# Patient Record
Sex: FEMALE | Race: WHITE
[De-identification: names, ages, dates, MRNs, and addresses within clinical notes are randomized per-mention and may not be internally consistent; named-entity substitution may affect disease eponyms.]

---

## 2019-10-04 ENCOUNTER — HOSPITAL ENCOUNTER (EMERGENCY)
Dept: HOSPITAL 97 - ER | Age: 49
LOS: 1 days | Discharge: HOME | End: 2019-10-05
Payer: COMMERCIAL

## 2019-10-04 DIAGNOSIS — R07.9: Primary | ICD-10-CM

## 2019-10-04 DIAGNOSIS — E03.9: ICD-10-CM

## 2019-10-04 DIAGNOSIS — I10: ICD-10-CM

## 2019-10-04 LAB
ALBUMIN SERPL BCP-MCNC: 4.4 G/DL (ref 3.4–5)
ALP SERPL-CCNC: 76 U/L (ref 45–117)
ALT SERPL W P-5'-P-CCNC: 46 U/L (ref 12–78)
AST SERPL W P-5'-P-CCNC: 45 U/L (ref 15–37)
BUN BLD-MCNC: 15 MG/DL (ref 7–18)
COHGB MFR BLDA: 0.7 % (ref 0–1.5)
GLUCOSE SERPLBLD-MCNC: 100 MG/DL (ref 74–106)
HCT VFR BLD CALC: 44.6 % (ref 36–45)
INR BLD: 1.01
LYMPHOCYTES # SPEC AUTO: 4 K/UL (ref 0.7–4.9)
MAGNESIUM SERPL-MCNC: 2.3 MG/DL (ref 1.8–2.4)
NT-PROBNP SERPL-MCNC: 24 PG/ML (ref ?–125)
OXYHGB MFR BLDA: 94.4 % (ref 94–97)
PMV BLD: 9.8 FL (ref 7.6–11.3)
POTASSIUM SERPL-SCNC: 3.3 MMOL/L (ref 3.5–5.1)
RBC # BLD: 4.94 M/UL (ref 3.86–4.86)
SAO2 % BLDA: 95.9 % (ref 92–98.5)
TROPONIN (EMERG DEPT USE ONLY): < 0.02 NG/ML (ref 0–0.04)
TSH SERPL DL<=0.05 MIU/L-ACNC: 74.7 UIU/ML (ref 0.36–3.74)

## 2019-10-04 PROCEDURE — 81025 URINE PREGNANCY TEST: CPT

## 2019-10-04 PROCEDURE — 82805 BLOOD GASES W/O2 SATURATION: CPT

## 2019-10-04 PROCEDURE — 84443 ASSAY THYROID STIM HORMONE: CPT

## 2019-10-04 PROCEDURE — 80076 HEPATIC FUNCTION PANEL: CPT

## 2019-10-04 PROCEDURE — 99285 EMERGENCY DEPT VISIT HI MDM: CPT

## 2019-10-04 PROCEDURE — 93005 ELECTROCARDIOGRAM TRACING: CPT

## 2019-10-04 PROCEDURE — 36415 COLL VENOUS BLD VENIPUNCTURE: CPT

## 2019-10-04 PROCEDURE — 85610 PROTHROMBIN TIME: CPT

## 2019-10-04 PROCEDURE — 80048 BASIC METABOLIC PNL TOTAL CA: CPT

## 2019-10-04 PROCEDURE — 84484 ASSAY OF TROPONIN QUANT: CPT

## 2019-10-04 PROCEDURE — 84439 ASSAY OF FREE THYROXINE: CPT

## 2019-10-04 PROCEDURE — 83880 ASSAY OF NATRIURETIC PEPTIDE: CPT

## 2019-10-04 PROCEDURE — 85025 COMPLETE CBC W/AUTO DIFF WBC: CPT

## 2019-10-04 PROCEDURE — 83735 ASSAY OF MAGNESIUM: CPT

## 2019-10-04 PROCEDURE — 81003 URINALYSIS AUTO W/O SCOPE: CPT

## 2019-10-04 PROCEDURE — 85379 FIBRIN DEGRADATION QUANT: CPT

## 2019-10-04 PROCEDURE — 71045 X-RAY EXAM CHEST 1 VIEW: CPT

## 2019-10-05 VITALS — SYSTOLIC BLOOD PRESSURE: 169 MMHG | DIASTOLIC BLOOD PRESSURE: 103 MMHG | OXYGEN SATURATION: 97 %

## 2019-10-05 VITALS — TEMPERATURE: 98.7 F

## 2019-10-05 LAB
HCT VFR BLD CALC: 41.5 % (ref 36–45)
LYMPHOCYTES # SPEC AUTO: 3.7 K/UL (ref 0.7–4.9)
PMV BLD: 9.5 FL (ref 7.6–11.3)
RBC # BLD: 4.6 M/UL (ref 3.86–4.86)

## 2019-10-05 NOTE — EKG
Test Date:    2019-10-05               Test Time:    00:04:32

Technician:   MICHAEL                                    

                                                     

MEASUREMENT RESULTS:                                       

Intervals:                                           

Rate:         73                                     

IN:           138                                    

QRSD:         86                                     

QT:           406                                    

QTc:          447                                    

Axis:                                                

P:            40                                     

IN:           138                                    

QRS:          0                                      

T:            56                                     

                                                     

INTERPRETIVE STATEMENTS:                                       

                                                     

Normal sinus rhythm

Anterior infarct, age undetermined

Abnormal ECG

Compared to ECG 10/04/2019 21:00:44

No significant changes



Electronically Signed On 10-05-19 10:45:20 CDT by Neo David

## 2019-10-05 NOTE — EKG
Test Date:    2019-10-04               Test Time:    21:00:44

Technician:   ANALIA                                     

                                                     

MEASUREMENT RESULTS:                                       

Intervals:                                           

Rate:         86                                     

MN:           134                                    

QRSD:         90                                     

QT:           372                                    

QTc:          445                                    

Axis:                                                

P:            39                                     

MN:           134                                    

QRS:          3                                      

T:            -4                                     

                                                     

INTERPRETIVE STATEMENTS:                                       

                                                     

Normal sinus rhythm

Anterior infarct, age undetermined

Abnormal ECG

No previous ECG available for comparison



Electronically Signed On 10-05-19 10:46:00 CDT by Neo David

## 2019-10-05 NOTE — EDPHYS
Physician Documentation                                                                           

 [facility]                                                                 

Name: Katih Laurent                                                                                

Age: 49 yrs                                                                                       

Sex: Female                                                                                       

: 1970                                                                                   

MRN: [id number]                                                                                   

Arrival Date: 10/04/2019                                                                          

Time: 20:27                                                                                       

Account#: [id number]                                                                            

Bed 5                                                                                             

Private MD:                                                                                       

ED Physician Jan Cameron                                                                             

HPI:                                                                                              

10/04                                                                                             

21:26 This 49 yrs old Female presents to ER via Ambulatory with complaints of Chest Pain,     pkl 

      Shortness Of Breath.                                                                        

21:26 The patient or guardian reports chest pain that is located primarily in the substernal  pkl 

      area. Onset: 4 day(s) ago, and became worse today. The pain radiates to the left arm,       

      left back. Associated signs and symptoms: Pertinent positives: shortness of breath. The     

      chest pain is described as squeezing. The patient has not experienced similar symptoms      

      in the past.                                                                                

                                                                                                  

OB/GYN:                                                                                           

20:51 LMP 2019                                                                              fc  

                                                                                                  

Historical:                                                                                       

- Allergies:                                                                                      

20:51 No Known Allergies;                                                                     fc  

- Home Meds:                                                                                      

20:51 None [Active];                                                                          fc  

- PMHx:                                                                                           

20:51 ITP;                                                                                    fc  

- PSHx:                                                                                           

20:51 spleenectomy; back surg; Knee surgery;                                                  fc  

                                                                                                  

- Immunization history:: Last tetanus immunization: unknown, Flu vaccine is not up to             

  date.                                                                                           

- Social history:: Smoking status: Patient uses tobacco products, denies chronic                  

  smoking, but will smoke occasionally, Patient uses alcohol, occasionally. Patient               

  uses street drugs, marijuana.                                                                   

- Ebola Screening: : Patient negative for fever greater than or equal to 101.5 degrees            

  Fahrenheit, and additional compatible Ebola Virus Disease symptoms Patient denies               

  exposure to infectious person Patient denies travel to an Ebola-affected area in the            

  21 days before illness onset.                                                                   

                                                                                                  

                                                                                                  

ROS:                                                                                              

21:26 Eyes: Negative for injury, pain, redness, and discharge, ENT: Negative for injury,      pkl 

      pain, and discharge, Neck: Negative for injury, pain, and swelling.                         

21:26 Cardiovascular: Positive for chest pain.                                                    

21:26 Respiratory: Positive for shortness of breath.                                              

21:26 Abdomen/GI: Positive for nausea, diarrhea, Negative for abdominal pain.                     

21:26 Back: Negative for pain at rest.                                                            

21:26 : Negative for urinary symptoms.                                                          

21:26 MS/extremity: Negative for acute changes.                                                   

21:26 Skin: Negative for rash.                                                                    

21:26 Neuro: Negative for altered mental status, loss of consciousness.                           

                                                                                                  

Exam:                                                                                             

21:26 Head/Face:  Normocephalic, atraumatic. Eyes:  Pupils equal round and reactive to light, pkl 

      extra-ocular motions intact.  Lids and lashes normal.  Conjunctiva and sclera are           

      non-icteric and not injected.  Cornea within normal limits.  Periorbital areas with no      

      swelling, redness, or edema. ENT:  Nares patent. No nasal discharge, no septal              

      abnormalities noted.  Tympanic membranes are normal and external auditory canals are        

      clear.  Oropharynx with no redness, swelling, or masses, exudates, or evidence of           

      obstruction, uvula midline.  Mucous membranes moist. Neck:  Trachea midline, no             

      thyromegaly or masses palpated, and no cervical lymphadenopathy.  Supple, full range of     

      motion without nuchal rigidity, or vertebral point tenderness.  No Meningismus.             

      Chest/axilla:  Normal chest wall appearance and motion.  Nontender with no deformity.       

      No lesions are appreciated. Cardiovascular:  Regular rate and rhythm with a normal S1       

      and S2.  No gallops, murmurs, or rubs.  Normal PMI, no JVD.  No pulse deficits.             

      Respiratory:  Lungs have equal breath sounds bilaterally, clear to auscultation and         

      percussion.  No rales, rhonchi or wheezes noted.  No increased work of breathing, no        

      retractions or nasal flaring. Abdomen/GI:  Soft, non-tender, with normal bowel sounds.      

      No distension or tympany.  No guarding or rebound.  No evidence of tenderness               

      throughout. Back:  No spinal tenderness.  No costovertebral tenderness.  Full range of      

      motion. Skin:  Warm, dry with normal turgor.  Normal color with no rashes, no lesions,      

      and no evidence of cellulitis. MS/ Extremity:  Pulses equal, no cyanosis.                   

      Neurovascular intact.  Full, normal range of motion. Neuro:  Awake and alert, GCS 15,       

      oriented to person, place, time, and situation.  Cranial nerves II-XII grossly intact.      

      Motor strength 5/5 in all extremities.  Sensory grossly intact.  Cerebellar exam            

      normal.  Normal gait.                                                                       

                                                                                                  

Vital Signs:                                                                                      

20:51  / 114 RA Sitting; Pulse 100; Resp 20; Temp 98.7(TE); Pulse Ox 98% on R/A; Weight fc  

      95.25 kg (R); Height 5 ft. 5 in. (165.10 cm) (R); Pain 8/10;                                

20:53  / 117 LA Sitting;                                                                fc  

21:46  / 103; Pulse 91; Resp 14 S; Pulse Ox 96% on R/A;                                 bb  

22:19  / 94; Pulse 85; Resp 16 S; Pulse Ox 95% on R/A;                                  bb  

23:57  / 103; Pulse 77; Resp 16 S; Pulse Ox 97% on R/A;                                 bb  

10/05                                                                                             

01:30  / 99; Pulse 70; Resp 16; Temp 97.6(TE); Pulse Ox 97% on R/A;                     ea  

10/04                                                                                             

20:51 Body Mass Index 34.95 (95.25 kg, 165.10 cm)                                             fc  

                                                                                                  

MDM:                                                                                              

10/04                                                                                             

21:03 Patient medically screened.                                                             pkl 

10/05                                                                                             

00:45 Data reviewed: vital signs, nurses notes, lab test result(s), EKG, radiologic studies,  pkl 

      plain films. ED course: Patient feeling much better. Advised to follow up with PCP next     

      week. Patient understood instructions.                                                      

                                                                                                  

10/04                                                                                             

21:13 Order name: Basic Metabolic Panel; Complete Time: 23:05                                 pkl 

10/04                                                                                             

21:13 Order name: CBC with Diff; Complete Time: 22:32                                         pkl 

10/04                                                                                             

21:13 Order name: LFT's; Complete Time: 23:05                                                 pkl 

10/04                                                                                             

21:13 Order name: Magnesium; Complete Time: 23:05                                             pkl 

10/04                                                                                             

21:13 Order name: NT PRO-BNP; Complete Time: 23:05                                            pkl 

10/04                                                                                             

21:13 Order name: PT-INR; Complete Time: 22:32                                                pkl 

10/04                                                                                             

21:13 Order name: Troponin (emerg Dept Use Only); Complete Time: 23:05                        pkl 

10/04                                                                                             

21:14 Order name: ABG; Complete Time: 22:32                                                   pkl 

10/04                                                                                             

21:14 Order name: TSH; Complete Time: 23:05                                                   pkl 

10/04                                                                                             

21:38 Order name: D-Dimer; Complete Time: 22:32                                               EDMS

10/04                                                                                             

21:44 Order name: Urine Dipstick--Ancillary (enter results); Complete Time: 22:32             em1 

10/04                                                                                             

21:44 Order name: Urine Pregnancy--Ancillary (enter results); Complete Time: 22:32            em1 

10/04                                                                                             

21:13 Order name: XRAY Chest (1 view); Complete Time: 23:53                                   pkl 

10/04                                                                                             

21:13 Order name: EKG; Complete Time: 21:14                                                   pkl 

10/04                                                                                             

21:13 Order name: Cardiac monitoring; Complete Time: 21:44                                    pkl 

10/04                                                                                             

21:13 Order name: EKG - Nurse/Tech; Complete Time: 21:44                                      pkl 

10/04                                                                                             

21:13 Order name: IV Saline Lock; Complete Time: 21:44                                        pkl 

10/04                                                                                             

21:13 Order name: Labs collected and sent; Complete Time: 21:44                               pkl 

10/04                                                                                             

22:36 Order name: T4 Free; Complete Time: 23:05                                               EDMS

10/04                                                                                             

23:53 Order name: EKG; Complete Time: 23:53                                                   pkl 

10/04                                                                                             

23:53 Order name: Troponin (emerg Dept Use Only); Complete Time: 00:43                        pkl 

10/04                                                                                             

23:53 Order name: CBC with Diff; Complete Time: 00:43                                         pkl 

10/04                                                                                             

21:13 Order name: O2 Per Protocol; Complete Time: 21:44                                       pkl 

10/04                                                                                             

21:13 Order name: O2 Sat Monitoring; Complete Time: :44                                     pkl 

                                                                                                  

Administered Medications:                                                                         

10/04                                                                                             

21:20 Drug: Nitroglycerin 0.4 mg Route: Sublingual;                                           bb  

10/05                                                                                             

00:00 Follow up: Response: No adverse reaction                                                ea  

10/04                                                                                             

23:07 Not Given (Patient Refused): morphine 4 mg IVP once; RASS on ADMIN: Combtv4, Very       bb  

      Agttd3, Agttd2, Rstlss1, AlertClm0, Drwsy-1, Lt Sdtn-2, Mod Sdtn-3, Dp Sdtn-4,              

      UnArsble-5                                                                                  

23:07 Not Given (Patient Refused): Zofran 4 mg IVP once; over 2 minutes                       bb  

23:07 Drug: NS 0.9% 1000 ml Route: IV; Rate: 1 bolus; Site: right antecubital;                bb  

23:27 Drug: K-Dur 40 mEq Route: PO;                                                           bb  

10/05                                                                                             

00:00 Follow up: Response: No adverse reaction                                                ea  

                                                                                                  

                                                                                                  

Disposition:                                                                                      

10/05/19 00:48 Discharged to Home. Impression: Chest pain. Hypertension. Hypothroidism.           

- Condition is Stable.                                                                            

                                                                                                  

- Prescriptions for Levothyroxine 100 mcg Oral Tablet - take 1 tablet by ORAL route               

  once daily take 30 minutes before breakfast; 30 tablet. Carvedilol 12.5 mg Oral                 

  Tablet - take 1 tablet by ORAL route 2 times per day with food; 60 tablet.                      

- Medication Reconciliation Form, Thank You Letter, Antibiotic Education, Prescription            

  Opioid Use form.                                                                                

- Follow up: Private Physician; When: 1 week; Reason: Re-evaluation by your physician.            

- Problem is new.                                                                                 

- Symptoms have improved.                                                                         

                                                                                                  

                                                                                                  

                                                                                                  

Signatures:                                                                                       

Dispatcher [employer]                           [facility]                                                 

Jan Cameron MD MD   pkAnn Macias RN                   Kaylie Gilbert RN RN bb Antunez, Elena, RN RN   ea                                                   

                                                                                                  

Corrections: (The following items were deleted from the chart)                                    

10/04                                                                                             

21:38 21:26 D-DIMER+[id number].BRZ ordered. [facility]

10/05                                                                                             

01:50 00:48 10/05/2019 00:48 Discharged to Home. Impression: Chest pain. Hypertension.        ea  

      Hypothroidism. Condition is Stable. Forms are Medication Reconciliation Form, Thank You     

      Letter, Antibiotic Education, Prescription Opioid Use. Follow up: Private Physician;        

      When: 1 week; Reason: Re-evaluation by your physician. Problem is new. Symptoms have        

      improved. pkl                                                                               

                                                                                                  

**************************************************************************************************

## 2019-10-05 NOTE — ER
Nurse's Notes                                                                                     

 [facility]                                                                 

Name: Kathi Laurent                                                                                

Age: 49 yrs                                                                                       

Sex: Female                                                                                       

: 1970                                                                                   

MRN: [id number]                                                                                   

Arrival Date: 10/04/2019                                                                          

Time: 20:27                                                                                       

Account#: [id number]                                                                            

Bed 5                                                                                             

Private MD:                                                                                       

Diagnosis: Chest pain. Hypertension. Hypothroidism                                                

                                                                                                  

Presentation:                                                                                     

10/04                                                                                             

20:47 Presenting complaint: Patient states: that she is having muscle spasms to back around   fc  

      to chest and occasionally left arm. Also having hot flashes. Positive nausea but not        

      vomiting. Also diarrhea. Pt states she just does not feel well. Transition of care:         

      patient was not received from another setting of care. Onset of symptoms was 2019. Risk Assessment: Do you want to hurt yourself or someone else? Patient reports no     

      desire to harm self or others. Initial Sepsis Screen:. Care prior to arrival: None.         

20:47 Method Of Arrival: Ambulatory                                                             

20:47 Acuity: INDY 3                                                                           fc  

                                                                                                  

OB/GYN:                                                                                           

20:51 LMP 2019                                                                                

                                                                                                  

Historical:                                                                                       

- Allergies:                                                                                      

20:51 No Known Allergies;                                                                     fc  

- Home Meds:                                                                                      

20:51 None [Active];                                                                          fc  

- PMHx:                                                                                           

20:51 ITP;                                                                                    fc  

- PSHx:                                                                                           

20:51 spleenectomy; back surg; Knee surgery;                                                  fc  

                                                                                                  

- Immunization history:: Last tetanus immunization: unknown, Flu vaccine is not up to             

  date.                                                                                           

- Social history:: Smoking status: Patient uses tobacco products, denies chronic                  

  smoking, but will smoke occasionally, Patient uses alcohol, occasionally. Patient               

  uses street drugs, marijuana.                                                                   

- Ebola Screening: : Patient negative for fever greater than or equal to 101.5 degrees            

  Fahrenheit, and additional compatible Ebola Virus Disease symptoms Patient denies               

  exposure to infectious person Patient denies travel to an Ebola-affected area in the            

  21 days before illness onset.                                                                   

                                                                                                  

                                                                                                  

Screenin:53 Abuse screen: Denies threats or abuse. Nutritional screening: No deficits noted.        fc  

      Tuberculosis screening: No symptoms or risk factors identified. Fall Risk None              

      identified.                                                                                 

                                                                                                  

Assessment:                                                                                       

21:06 General: Appears uncomfortable, Behavior is appropriate for age. Pain: Complains of     ea  

      pain in chest Pain radiates to back. Pain: Quality of pain is described as pressure.        

      Neuro: Level of Consciousness is awake, alert, obeys commands, Oriented to person,          

      place, time, situation. Cardiovascular: Patient's skin is warm and dry. Respiratory:        

      Airway is patent Respiratory effort is even, unlabored, Respiratory pattern is regular,     

      symmetrical. Derm: Skin is pink, warm \T\ dry.                                              

21:45 Reassessment: No changes from previously documented assessment. Patient is alert,       bb  

      oriented x 3, equal unlabored respirations, skin warm/dry/pink. awaiting diagnostic         

      results family at bedside.                                                                  

22:18 Reassessment: Patient is alert, oriented x 3, equal unlabored respirations, skin        bb  

      warm/dry/pink. pt resting quietly, states she feels dehydrated EDP notified, family at      

      bedside.                                                                                    

23:56 Reassessment: Patient is alert, oriented x 3, equal unlabored respirations, skin        bb  

      warm/dry/pink. pt resting quietly, family at bedside.                                       

10/05                                                                                             

00:00 Reassessment: Patient and/or family updated on plan of care and expected duration. Pain ea  

      level reassessed. Patient is alert, oriented x 3, equal unlabored respirations, skin        

      warm/dry/pink. Patient states feeling better.                                               

01:40 Reassessment: Patient and/or family updated on plan of care and expected duration. Pain ea  

      level reassessed. Patient is alert, oriented x 3, equal unlabored respirations, skin        

      warm/dry/pink. Discharge instruction given to patient, verbalize the understanding of       

      instruction. Pt left ED ambulatory accompanied by family, pt tolerating well. Patient       

      states feeling better.                                                                      

                                                                                                  

Vital Signs:                                                                                      

10/04                                                                                             

20:51  / 114 RA Sitting; Pulse 100; Resp 20; Temp 98.7(TE); Pulse Ox 98% on R/A; Weight fc  

      95.25 kg (R); Height 5 ft. 5 in. (165.10 cm) (R); Pain 8/10;                                

20:53  / 117 LA Sitting;                                                                fc  

21:46  / 103; Pulse 91; Resp 14 S; Pulse Ox 96% on R/A;                                 bb  

22:19  / 94; Pulse 85; Resp 16 S; Pulse Ox 95% on R/A;                                  bb  

23:57  / 103; Pulse 77; Resp 16 S; Pulse Ox 97% on R/A;                                 bb  

10/05                                                                                             

01:30  / 99; Pulse 70; Resp 16; Temp 97.6(TE); Pulse Ox 97% on R/A;                     ea  

10/04                                                                                             

20:51 Body Mass Index 34.95 (95.25 kg, 165.10 cm)                                               

                                                                                                  

ED Course:                                                                                        

10/04                                                                                             

20:27 Patient arrived in ED.                                                                  cl3 

20:49 Triage completed.                                                                       fc  

20:51 Arm band placed on Patient placed in an exam room, on a stretcher.                      fc  

21:03 Jan Cameron MD is Attending Physician.                                                    pkl 

21:06 Marleny Kay, RYAN is Primary Nurse.                                                    ea  

21:07 Patient maintains SpO2 saturation greater than 95% on room air.                         ea  

21:08 Patient has correct armband on for positive identification. Bed in low position. Call   ea  

      light in reach. Side rails up X2. Cardiac monitor on. Pulse ox on. NIBP on.                 

21:20 Initial lab(s) drawn, by me, sent to lab. Urine collected:. Inserted saline lock: 20    bb  

      gauge in right hand, using aseptic technique. Blood collected. Missed attempt(s): 20        

      gauge in right upper arm. Bleeding controlled, band aid applied, catheter tip intact.       

21:57 XRAY Chest (1 view) In Process Unspecified.                                             EDMS

10/05                                                                                             

01:49 No provider procedures requiring assistance completed. IV discontinued, intact,         ea  

      bleeding controlled, No redness/swelling at site. Pressure dressing applied.                

                                                                                                  

Administered Medications:                                                                         

10/04                                                                                             

21:20 Drug: Nitroglycerin 0.4 mg Route: Sublingual;                                           bb  

10/05                                                                                             

00:00 Follow up: Response: No adverse reaction                                                ea  

10/04                                                                                             

23:07 Not Given (Patient Refused): morphine 4 mg IVP once; RASS on ADMIN: Combtv4, Very       bb  

      Agttd3, Agttd2, Rstlss1, AlertClm0, Drwsy-1, Lt Sdtn-2, Mod Sdtn-3, Dp Sdtn-4,              

      UnArsble-5                                                                                  

23:07 Not Given (Patient Refused): Zofran 4 mg IVP once; over 2 minutes                       bb  

23:07 Drug: NS 0.9% 1000 ml Route: IV; Rate: 1 bolus; Site: right antecubital;                bb  

23:27 Drug: K-Dur 40 mEq Route: PO;                                                           bb  

10/05                                                                                             

00:00 Follow up: Response: No adverse reaction                                                ea  

                                                                                                  

                                                                                                  

Outcome:                                                                                          

00:48 Discharge ordered by MD.                                                                pkl 

01:49 Discharged to home ambulatory, with family.                                             ea  

:49 Condition: stable                                                                           

01:49 Discharge instructions given to patient, Instructed on discharge instructions, follow       

      up and referral plans. medication usage, Demonstrated understanding of instructions,        

      follow-up care, medications, Prescriptions given X 2.                                       

01:50 Patient left the ED.                                                                    alisha  

                                                                                                  

Signatures:                                                                                       

Dispatcher [employer]                           EDJan Guevara MD MD pkl Chretien, Felicia RN                   RN   Kaylie Hernandez RN                     RN   Marleny Gracia RN RN ea Lewis, Charde                                cl3                                                  

                                                                                                  

**************************************************************************************************